# Patient Record
(demographics unavailable — no encounter records)

---

## 2025-03-21 NOTE — PHYSICAL EXAM
[de-identified] : Hip/leg L  Inspection Normal.  Palpation  Tenderness: mid-portion of mm, moderate   L hamstring mm are tense   Hip ROM Flexion: normal Extension: normal Internal rotation: normal External rotation: normal  Knee flexion- normal - pain elicited w/ supine flexion  Knee extension- normal  Ankle ROM- normal   Hip/knee Motor Strength Knee flexion (hamstrings): 5/5 Knee extension (quadriceps): 5/5  Hip flexion: 5/5 Hip extension: 5/5 Hip abduction: 5/5 Hip adduction: 5/5  Ankle Motor Strength Normal  Sensory index Normal.  Distal pulses Normal.

## 2025-03-21 NOTE — HISTORY OF PRESENT ILLNESS
[de-identified] : 23 year old no PMHx presents today for L hamstring pain. Patient was walking [place] a bunch of stadium steps over the summer and believes he strained his hamstring that evening, as he was quite sore the next day. His pain is located in the middle of his hamstring predominately, but he also experiences mild back/buttock pain. His hamstring pain is present every day. Although he abated the pain by avoiding strenuous activities a few months ago, it quickly returned after he went for a jog. Denies weakness, numbness, bruising. His symptoms transiently improved following massage, biofeeze. However, he has not formally rehabbed.

## 2025-03-21 NOTE — HISTORY OF PRESENT ILLNESS
[de-identified] : 23 year old no PMHx presents today for L hamstring pain. Patient was walking [place] a bunch of stadium steps over the summer and believes he strained his hamstring that evening, as he was quite sore the next day. His pain is located in the middle of his hamstring predominately, but he also experiences mild back/buttock pain. His hamstring pain is present every day. Although he abated the pain by avoiding strenuous activities a few months ago, it quickly returned after he went for a jog. Denies weakness, numbness, bruising. His symptoms transiently improved following massage, biofeeze. However, he has not formally rehabbed.

## 2025-03-21 NOTE — PHYSICAL EXAM
[de-identified] : Hip/leg L  Inspection Normal.  Palpation  Tenderness: mid-portion of mm, moderate   L hamstring mm are tense   Hip ROM Flexion: normal Extension: normal Internal rotation: normal External rotation: normal  Knee flexion- normal - pain elicited w/ supine flexion  Knee extension- normal  Ankle ROM- normal   Hip/knee Motor Strength Knee flexion (hamstrings): 5/5 Knee extension (quadriceps): 5/5  Hip flexion: 5/5 Hip extension: 5/5 Hip abduction: 5/5 Hip adduction: 5/5  Ankle Motor Strength Normal  Sensory index Normal.  Distal pulses Normal.